# Patient Record
Sex: FEMALE | Race: WHITE | NOT HISPANIC OR LATINO | URBAN - METROPOLITAN AREA
[De-identification: names, ages, dates, MRNs, and addresses within clinical notes are randomized per-mention and may not be internally consistent; named-entity substitution may affect disease eponyms.]

---

## 2017-03-31 ENCOUNTER — HISTORICAL (OUTPATIENT)
Dept: LAB | Facility: HOSPITAL | Age: 32
End: 2017-03-31

## 2017-04-03 ENCOUNTER — HISTORICAL (OUTPATIENT)
Dept: LAB | Facility: HOSPITAL | Age: 32
End: 2017-04-03

## 2018-08-06 LAB
ABS NEUT (OLG): 6.06 X10(3)/MCL (ref 2.1–9.2)
B-HCG SERPL QL: NEGATIVE
BASOPHILS # BLD AUTO: 0 X10(3)/MCL (ref 0–0.2)
BASOPHILS NFR BLD AUTO: 0 %
EOSINOPHIL # BLD AUTO: 0.1 X10(3)/MCL (ref 0–0.9)
EOSINOPHIL NFR BLD AUTO: 1 %
ERYTHROCYTE [DISTWIDTH] IN BLOOD BY AUTOMATED COUNT: 12.6 % (ref 11.5–17)
HCT VFR BLD AUTO: 37.1 % (ref 37–47)
HGB BLD-MCNC: 12.1 GM/DL (ref 12–16)
LYMPHOCYTES # BLD AUTO: 1.4 X10(3)/MCL (ref 0.6–4.6)
LYMPHOCYTES NFR BLD AUTO: 16 %
MCH RBC QN AUTO: 32 PG (ref 27–31)
MCHC RBC AUTO-ENTMCNC: 32.6 GM/DL (ref 33–36)
MCV RBC AUTO: 98.1 FL (ref 80–94)
MONOCYTES # BLD AUTO: 0.7 X10(3)/MCL (ref 0.1–1.3)
MONOCYTES NFR BLD AUTO: 9 %
NEUTROPHILS # BLD AUTO: 6.06 X10(3)/MCL (ref 1.4–7.9)
NEUTROPHILS NFR BLD AUTO: 73 %
PLATELET # BLD AUTO: 381 X10(3)/MCL (ref 130–400)
PMV BLD AUTO: 8.8 FL (ref 9.4–12.4)
RBC # BLD AUTO: 3.78 X10(6)/MCL (ref 4.2–5.4)
WBC # SPEC AUTO: 8.3 X10(3)/MCL (ref 4.5–11.5)

## 2018-08-10 ENCOUNTER — HISTORICAL (OUTPATIENT)
Dept: ADMINISTRATIVE | Facility: HOSPITAL | Age: 33
End: 2018-08-10

## 2018-08-10 LAB — GRAM STN SPEC: NORMAL

## 2018-08-14 LAB — FINAL CULTURE: NORMAL

## 2018-08-15 LAB — FINAL CULTURE: NORMAL

## 2020-11-03 ENCOUNTER — HISTORICAL (OUTPATIENT)
Dept: LAB | Facility: HOSPITAL | Age: 35
End: 2020-11-03

## 2020-11-03 LAB
ABS NEUT (OLG): 2.77 X10(3)/MCL (ref 2.1–9.2)
ALBUMIN SERPL-MCNC: 4.36 GM/DL (ref 3.4–5)
ALBUMIN/GLOB SERPL: 1.4 RATIO (ref 1.1–2)
ALP SERPL-CCNC: 69 UNIT/L (ref 46–116)
ALT SERPL-CCNC: 23 UNIT/L (ref 12–78)
APPEARANCE, UA: ABNORMAL
AST SERPL-CCNC: 18 UNIT/L (ref 15–37)
BACTERIA SPEC CULT: ABNORMAL
BASOPHILS # BLD AUTO: 0 X10(3)/MCL (ref 0–0.2)
BASOPHILS NFR BLD AUTO: 0 %
BILIRUB SERPL-MCNC: 0.3 MG/DL (ref 0.2–1)
BILIRUB UR QL STRIP: NEGATIVE
BILIRUBIN DIRECT+TOT PNL SERPL-MCNC: 0.14 MG/DL (ref 0–0.2)
BILIRUBIN DIRECT+TOT PNL SERPL-MCNC: 0.16 MG/DL (ref 0–0.8)
BUN SERPL-MCNC: 9.8 MG/DL (ref 7–18)
CALCIUM SERPL-MCNC: 9.9 MG/DL (ref 8.5–10.1)
CHLORIDE SERPL-SCNC: 104 MMOL/L (ref 98–107)
CHOLEST SERPL-MCNC: 146 MG/DL (ref 0–200)
CHOLEST/HDLC SERPL: 1.5 {RATIO} (ref 0–4)
CO2 SERPL-SCNC: 31.5 MMOL/L (ref 21–32)
COLOR UR: YELLOW
CREAT SERPL-MCNC: 0.74 MG/DL (ref 0.6–1.3)
DEPRECATED CALCIDIOL+CALCIFEROL SERPL-MC: 35.2 NG/ML (ref 6.6–49.9)
EOSINOPHIL # BLD AUTO: 0 X10(3)/MCL (ref 0–0.9)
EOSINOPHIL NFR BLD AUTO: 1 %
ERYTHROCYTE [DISTWIDTH] IN BLOOD BY AUTOMATED COUNT: 12.6 % (ref 11.5–17)
EST. AVERAGE GLUCOSE BLD GHB EST-MCNC: 97 MG/DL
GLOBULIN SER-MCNC: 3.14 GM/DL (ref 2.4–3.5)
GLUCOSE (UA): NEGATIVE
GLUCOSE SERPL-MCNC: 96 MG/DL (ref 74–106)
HBA1C MFR BLD: 5 % (ref 4.5–6.2)
HCT VFR BLD AUTO: 46.5 % (ref 37–47)
HDLC SERPL-MCNC: 98 MG/DL (ref 40–60)
HGB BLD-MCNC: 14.8 GM/DL (ref 12–16)
HGB UR QL STRIP: ABNORMAL
KETONES UR QL STRIP: NEGATIVE
LDLC SERPL CALC-MCNC: 42 MG/DL (ref 0–129)
LEUKOCYTE ESTERASE UR QL STRIP: ABNORMAL
LYMPHOCYTES # BLD AUTO: 1.2 X10(3)/MCL (ref 0.6–4.6)
LYMPHOCYTES NFR BLD AUTO: 26 %
MCH RBC QN AUTO: 32.9 PG (ref 27–31)
MCHC RBC AUTO-ENTMCNC: 31.8 GM/DL (ref 33–36)
MCV RBC AUTO: 103.3 FL (ref 80–94)
MONOCYTES # BLD AUTO: 0.4 X10(3)/MCL (ref 0.1–1.3)
MONOCYTES NFR BLD AUTO: 10 %
NEUTROPHILS # BLD AUTO: 2.77 X10(3)/MCL (ref 1.4–7.9)
NEUTROPHILS NFR BLD AUTO: 62 %
NITRITE UR QL STRIP: NEGATIVE
PH UR STRIP: 7 [PH] (ref 5–9)
PLATELET # BLD AUTO: 254 X10(3)/MCL (ref 130–400)
PMV BLD AUTO: 9.5 FL (ref 9.4–12.4)
POTASSIUM SERPL-SCNC: 5.4 MMOL/L (ref 3.5–5.1)
PROT SERPL-MCNC: 7.5 GM/DL (ref 6.4–8.2)
PROT UR QL STRIP: NEGATIVE
RBC # BLD AUTO: 4.5 X10(6)/MCL (ref 4.2–5.4)
RBC #/AREA URNS HPF: ABNORMAL /[HPF]
SODIUM SERPL-SCNC: 140 MMOL/L (ref 136–145)
SP GR UR STRIP: >=1.03 (ref 1–1.03)
SQUAMOUS EPITHELIAL, UA: ABNORMAL
TRIGL SERPL-MCNC: 31 MG/DL
TSH SERPL-ACNC: 0.67 MIU/ML (ref 0.36–3.74)
UROBILINOGEN UR STRIP-ACNC: 0.2
VIT B12 SERPL-MCNC: 1021 PG/ML (ref 193–986)
VLDLC SERPL CALC-MCNC: 6 MG/DL
WBC # SPEC AUTO: 4.4 X10(3)/MCL (ref 4.5–11.5)
WBC #/AREA URNS HPF: ABNORMAL /HPF

## 2022-04-11 ENCOUNTER — HISTORICAL (OUTPATIENT)
Dept: ADMINISTRATIVE | Facility: HOSPITAL | Age: 37
End: 2022-04-11

## 2022-04-24 VITALS
WEIGHT: 136.25 LBS | SYSTOLIC BLOOD PRESSURE: 120 MMHG | BODY MASS INDEX: 21.38 KG/M2 | HEIGHT: 67 IN | DIASTOLIC BLOOD PRESSURE: 72 MMHG | OXYGEN SATURATION: 99 %

## 2022-04-30 NOTE — OP NOTE
DATE OF SURGERY:    08/10/2018    SURGEON:  Reggie Bales MD    OPERATIONS PERFORMED:    1. Dilation and curettage.   2. NovaSure endometrial ablation.    3. Diagnostic laparoscopy.  4. Lysis of pelvic and abdominal adhesions.    PREOPERATIVE DIAGNOSIS:  The patient is a 33-year-old suffering with menorrhagia and pelvic pain, unresponsive to conservative measures.    POSTOPERATIVE DIAGNOSES:    1. The patient is a 33-year-old, suffering with menorrhagia and pelvic pain, unresponsive to conservative measures.    2. Pelvic and abdominal adhesions.    PROCEDURE IN DETAIL:  After proper consents were obtained, the patient was brought to the operating room, placed in supine position, underwent general anesthesia intubation without difficulty.  She was then placed in dorsal lithotomy position, prepped and draped in a sterile fashion.  The weighted speculum was placed in the vaginal vault and anterior lip of the cervix was grasped with a single-tooth tenaculum.  The uterus was sounded to 8 cm.  The cervix was dilated to a size #8 Hegar.  Using a medium size sharp curette, endometrial cavity was scraped until there was a gritty consistency felt throughout the cavity.  The tissue was sent to Pathology.  The next part of the procedure involved the endometrial ablation, which was accomplished with the NovaSure apparatus.  No system malfunctions or complications were encountered.  The uterine length was 4.0 and the uterine width was 3.0.  At this point in time, the acorn manipulator was placed into the cervical canal.  A small incision was then made underneath the umbilicus and also 2 fingerbreadths above the pubic symphysis.  A Veress needle was placed into the abdominal cavity, and 2 L of CO2 was allowed to enter the abdominal cavity.  Trocars were then introduced without difficulty.  Contents of the pelvic cavity were examined.  Along the right abdominal wall, there were noted to be adhesions between the wall  and the ascending colon.  There were also noted to be adhesions between the left pelvic sidewall and the left adnexum.  These adhesions were taken down with sharp and blunt dissection.  The left adnexa adhesions were taken down with sharp and blunt dissection, thus freeing up the ovary and left fallopian tube.  The left fallopian tube was slightly swollen.  There was no evidence of any sergei pus, endometriosis or fibroids.  Once the left adnexal adhesions were freed, the right adnexa was checked and there was no evidence of any abnormalities of the right adnexa.  In the cul-de-sac, there were no signs of endometriosis or PID.  At this point time, due to the nature of these adhesions, I consulted Dr. Misha Knott.  He came into the operating room, and we inspected these adhesions between the colon and anterior abdominal wall.  Using very gentle blunt dissection, it was totally freed from the anterior abdominal wall.  No scissors were involved in this dissection; it was just blunt gentle traction.  The terminal ileum in Dr. Knott's opinion seemed to be inflamed and possibly suggesting Crohn disease.       At this point in time, the pelvic cavity was irrigated thoroughly.  There was no evidence of any visceral injuries and no evidence of any bleeding.  CO2 was allowed to escape from abdominal cavity.  Trocars were removed without difficulty.  The small incision was closed with 3-0 Vicryl subcuticular stitch.  The patient was carefully brought out of dorsal lithotomy position and brought to the recovery room in stable fashion, tolerating the procedure well.  I failed to mention that the appendix was absent from a previous appendectomy.        ______________________________  Reggie Bales MD CEP/UD  DD:  08/10/2018  Time:  01:45PM  DT:  08/10/2018  Time:  02:15PM  Job #:  [id number]

## 2022-07-17 PROBLEM — Z72.0 TOBACCO USER: Status: ACTIVE | Noted: 2022-07-17

## 2022-07-17 PROBLEM — Z00.00 WELLNESS EXAMINATION: Status: ACTIVE | Noted: 2022-07-17

## 2022-07-17 PROBLEM — F90.9 ATTENTION DEFICIT DISORDER OF ADULT WITH HYPERACTIVITY: Status: ACTIVE | Noted: 2022-07-17

## 2022-07-17 PROBLEM — N92.0 MENORRHAGIA: Status: ACTIVE | Noted: 2022-07-17

## 2022-07-17 PROBLEM — K21.9 GASTROESOPHAGEAL REFLUX DISEASE WITHOUT ESOPHAGITIS: Status: ACTIVE | Noted: 2022-07-17

## 2022-07-17 PROBLEM — K85.90 PANCREATITIS: Status: ACTIVE | Noted: 2022-07-17

## 2022-07-17 PROBLEM — K31.84 GASTROPARESIS: Status: ACTIVE | Noted: 2022-07-17

## 2022-10-17 PROBLEM — Z00.00 WELLNESS EXAMINATION: Status: RESOLVED | Noted: 2022-07-17 | Resolved: 2022-10-17
